# Patient Record
Sex: FEMALE | Race: WHITE
[De-identification: names, ages, dates, MRNs, and addresses within clinical notes are randomized per-mention and may not be internally consistent; named-entity substitution may affect disease eponyms.]

---

## 2017-06-12 ENCOUNTER — HOSPITAL ENCOUNTER (OUTPATIENT)
Dept: HOSPITAL 76 - DI | Age: 72
Discharge: HOME | End: 2017-06-12
Attending: FAMILY MEDICINE
Payer: MEDICARE

## 2017-06-12 DIAGNOSIS — Z12.31: Primary | ICD-10-CM

## 2017-06-12 PROCEDURE — 77067 SCR MAMMO BI INCL CAD: CPT

## 2017-06-13 NOTE — MAMMOGRAPHY REPORT
DIGITAL SCREENING MAMMOGRAM:  06/12/2017

 

CLINICAL INDICATION:  A 71-year-old with history of benign right breast biopsy for screening.  

 

COMPARISON:  08/2015, 01/2014, 07/2012, 05/2011, 04/2010.

 

TECHNIQUE:  Routine CC and MLO projections were obtained of the breasts.

 

FINDINGS:  The breasts again demonstrate scattered fibroglandular densities bilaterally.  A few punct
ate, typically benign calcifications are present.  No suspicious masses, clustered microcalcification
s, or regions of architectural distortion are identified.  

 

IMPRESSION:  BENIGN FINDINGS. 

 

RECOMMENDATION:  Routine annual screening unless otherwise clinically indicated. 

 

BI-RADS category 2, benign findings. 

 

STANDARD QUALIFYING STATEMENTS

 

1. This examination was reviewed with the aid of Computer-Aided Detection (CAD).

 

2. A negative or benign imaging report should not delay biopsy if clinically suspicious findings are 
present. Consider surgical consultation if warranted. More than 5% of cancers are not identified by i
maging.

 

3. Dense breasts may obscure an underlying neoplasm.

 

 

 

DD:06/13/2017 16:39:31  DT: 06/13/2017 18:04  JOB #: [id number]  EXT JOB #:[id number]

## 2017-06-15 ENCOUNTER — HOSPITAL ENCOUNTER (OUTPATIENT)
Dept: HOSPITAL 76 - LAB.WCP | Age: 72
Discharge: HOME | End: 2017-06-15
Attending: FAMILY MEDICINE
Payer: MEDICARE

## 2017-06-15 DIAGNOSIS — M81.0: ICD-10-CM

## 2017-06-15 DIAGNOSIS — I10: Primary | ICD-10-CM

## 2017-06-15 DIAGNOSIS — E04.2: ICD-10-CM

## 2017-06-15 LAB
ALBUMIN/GLOB SERPL: 1.1 {RATIO} (ref 1–2.2)
ANION GAP SERPL CALCULATED.4IONS-SCNC: 9 MMOL/L (ref 6–13)
BILIRUB BLD-MCNC: 0.7 MG/DL (ref 0.2–1)
BUN SERPL-MCNC: 20 MG/DL (ref 6–20)
CALCIUM UR-MCNC: 9.4 MG/DL (ref 8.5–10.3)
CHLORIDE SERPL-SCNC: 98 MMOL/L (ref 101–111)
CHOLEST SERPL-MCNC: 213 MG/DL
CO2 SERPL-SCNC: 28 MMOL/L (ref 21–32)
CREAT SERPLBLD-SCNC: 0.7 MG/DL (ref 0.4–1)
GFRSERPLBLD MDRD-ARVRAT: 82 ML/MIN/{1.73_M2} (ref 89–?)
GLOBULIN SER-MCNC: 3.7 G/DL (ref 2.1–4.2)
GLUCOSE SERPL-MCNC: 94 MG/DL (ref 70–100)
HDLC SERPL-MCNC: 67 MG/DL
HDLC SERPL: 3.2 {RATIO} (ref ?–4.4)
LDLC/HDLC SERPL: 2 {RATIO} (ref ?–4.4)
POTASSIUM SERPL-SCNC: 3.6 MMOL/L (ref 3.5–5)
PROT SPEC-MCNC: 7.7 G/DL (ref 6.7–8.2)
SODIUM SERPLBLD-SCNC: 135 MMOL/L (ref 135–145)
TRIGL P FAST SERPL-MCNC: 56 MG/DL
VLDLC SERPL-SCNC: 11 MG/DL

## 2017-06-15 PROCEDURE — 80053 COMPREHEN METABOLIC PANEL: CPT

## 2017-06-15 PROCEDURE — 36415 COLL VENOUS BLD VENIPUNCTURE: CPT

## 2017-06-15 PROCEDURE — 84443 ASSAY THYROID STIM HORMONE: CPT

## 2017-06-15 PROCEDURE — 80061 LIPID PANEL: CPT

## 2017-07-22 ENCOUNTER — HOSPITAL ENCOUNTER (EMERGENCY)
Dept: HOSPITAL 76 - ED | Age: 72
Discharge: HOME | End: 2017-07-22
Payer: MEDICARE

## 2017-07-22 VITALS — DIASTOLIC BLOOD PRESSURE: 74 MMHG | SYSTOLIC BLOOD PRESSURE: 152 MMHG

## 2017-07-22 DIAGNOSIS — I10: ICD-10-CM

## 2017-07-22 DIAGNOSIS — E03.9: ICD-10-CM

## 2017-07-22 DIAGNOSIS — M16.12: Primary | ICD-10-CM

## 2017-07-22 DIAGNOSIS — M81.0: ICD-10-CM

## 2017-07-22 PROCEDURE — 73502 X-RAY EXAM HIP UNI 2-3 VIEWS: CPT

## 2017-07-22 PROCEDURE — 99283 EMERGENCY DEPT VISIT LOW MDM: CPT

## 2017-07-22 NOTE — XRAY PRELIMINARY REPORT
Accession: [id number]

Exam: XR Hip w/Pelvis 2-3V LT

 

IMPRESSION: 

1. Negative for acute fracture in the pelvis and left hip; no dislocation or subluxation. 

2. Chronic focal deformity, likely old fracture in the left superior and inferior pubic rami. 

3. 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

 

[facility]

 

SITE ID: [id number]

## 2017-07-22 NOTE — ED PHYSICIAN DOCUMENTATION
History of Present Illness





- Stated complaint


Stated Complaint: LEFT HIP PX





- Chief complaint


Chief Complaint: Ext Problem





- Additonal information


Additional information: 





hx from pt


known severe osteoporosis and hx spont pelvic fx


severe posterior high left hip pain


no trauma


no rash


worse with trying to pear weight


unable to describe nature of pain but says it is 15/10





Review of Systems


Constitutional: denies: Fever, Chills


Cardiac: denies: Chest pain / pressure


Respiratory: denies: Dyspnea


GI: denies: Abdominal Pain


Skin: denies: Rash


Musculoskeletal: reports: Back pain, Joint pain


Endocrine: denies: Easy bruising / bleeding


Immunocompromised: denies: Immunocompromised





PD PAST MEDICAL HISTORY





- Past Medical History


Past Medical History: Yes


Cardiovascular: Hypertension


Respiratory: None


Neuro: None


Endocrine/Autoimmune: HyPOthyroidism


GI: None


: None


HEENT: None


Psych: None


Musculoskeletal: None


Derm: None





- Past Surgical History


Past Surgical History: Yes


/GYN: Hysterectomy, Oophrectomy





- Present Medications


Home Medications: 


 Ambulatory Orders











 Medication  Instructions  Recorded  Confirmed


 


Levothyroxine [Synthroid] 50 mcg PO QDAC 07/01/13 07/22/17


 


Lisinopril 10 mg PO DAILY 07/01/13 07/22/17


 


hydroCHLOROthiazide [Hydrodiuril] 25 mg PO DAILY 07/01/13 07/22/17


 


Cholecalciferol (Vitamin D3) 2,000 unit PO DAILY 08/06/15 07/22/17





[Vitamin D]   


 


Lidocaine Patch 5% [Lidoderm Patch] 1 each TOP DAILY PRN #10 patch 07/22/17 














- Allergies


Allergies/Adverse Reactions: 


 Allergies











Allergy/AdvReac Type Severity Reaction Status Date / Time


 


No Known Drug Allergies Allergy   Verified 07/01/13 10:01














- Social History


Does the pt smoke?: No


Smoking Status: Never smoker





PD ED PE NORMAL





- Vitals


Vital signs reviewed: Yes





- Cardiac


Cardiac: RRR





- Respiratory


Respiratory: No respiratory distress, Clear bilaterally





- Abdomen


Abdomen: Soft, Non tender, Other (no pulsatile mass)





- Back


Back: No spinal TTP, Other (TTP high lateral posterior hip between SI and 

breater troch, not short or rotated, MSV intact)





- Derm


Derm: No rash





Results





- Vitals


Vitals: 


 Vital Signs - 24 hr











  07/22/17 07/22/17





  10:05 11:32


 


Temperature 36.7 C 


 


Heart Rate 109 H 106 H


 


Respiratory 20 16





Rate  


 


Blood Pressure 171/88 H 152/74 H


 


O2 Saturation 100 99








 Oxygen











O2 Source                      Room air

















- Rads (name of study)


  ** hip pelvis


Radiology: See rad report (neg for acute, old rami fx and degen changes)





PD MEDICAL DECISION MAKING





- ED course


ED course: 





no injury, chronic / old fx sup inf rami and degen changes but no acute process 

on xrays


no rash to suggest shingles and that would be unlikely to be aggrevated by wt 

bearing


no abd TTP or pulsatile mass


no back pain, no radicular sx


will reassure and dc with pain control and PMD fup


pt able to ambulate in ER


cautioned re shingles rash


has a walker she can borrow











Departure





- Departure


Disposition: 01 Home, Self Care


Clinical Impression: 


 Arthritis


Condition: Good


Follow-Up: 


Abdias Porras MD [Provider Admit Priv/Credential] - 


Prescriptions: 


Lidocaine Patch 5% [Lidoderm Patch] 1 each TOP DAILY PRN #10 patch


 PRN Reason: Pain


Comments: 


The xray does not show any new fractures. You do have degenerative changes / 

arthritis


Your exam does not suggest an aneurysm or slipped disk


I think it is OK for you to go home with lidocaine patches and tylenol as 

needed for the apin and borrowing the walker for extra support until you feel 

better


As we discussed, sometimes shingles causes severe one sided pain for a few days 

before the rash becomes visible - please check your back every day and if you 

develop a one sided rash, see your PMD or come back to the ER for viral 

medications


Otherwise follow up with Dr Porras as needed - please have him check your blood 

pressure because it was high today

## 2017-07-22 NOTE — XRAY REPORT
EXAM:

LEFT HIP AND PELVIS RADIOGRAPHY

 

EXAM DATE: 7/22/2017 11:13 AM.

 

HISTORY: Severe high posterior lateral hip pain for 3 days. Osteoporosis, prior history of spontaneou
s pelvic fracture.

 

COMPARISONS: None.

 

TECHNIQUE: 1 view of the pelvis and 1 view of the hip.

 

FINDINGS: 

Bones: No left femur fracture or bone lesion. There is chronic deformity, likely old fracture in the 
left inferior ramus. There is also increased sclerotic density with expansile contour in the proximal
 part of the left superior pubic ramus, also suggesting posttraumatic changes.

 

Joints: The bilateral hip demonstrated mild diminishment of the joint space with mild subchondral scl
erotic reactions, greater in the right hip; the pubis symphysis, and sacroiliac joints and lower lumb
ar spine also demonstrate mild degenerative changes.

 

Soft Tissues: There is a calcific body in the inferior right lower pelvis, 9 x 13 mm, likely a nonspe
cific soft tissue calcification. No soft tissue swelling.

 

IMPRESSION: 

1. Negative for acute fracture in the pelvis and left hip; no dislocation or subluxation. 

2. Chronic focal deformity, likely old fracture in the left superior and inferior pubic rami. 

3. 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

 

[facility]

Referring Provider Line: [phone]

 

SITE ID: [id number]

## 2017-10-13 ENCOUNTER — HOSPITAL ENCOUNTER (OUTPATIENT)
Dept: HOSPITAL 76 - DI | Age: 72
Discharge: HOME | End: 2017-10-13
Attending: FAMILY MEDICINE
Payer: MEDICARE

## 2017-10-13 DIAGNOSIS — M81.0: Primary | ICD-10-CM

## 2017-10-13 PROCEDURE — 77080 DXA BONE DENSITY AXIAL: CPT

## 2017-10-13 NOTE — DEXA REPORT
DEXA SCAN:  10/13/2017



CLINICAL INDICATION: Postmenopausal. 



TECHNIQUE: Dual energy x-ray absorptiometry (DXA) was performed on a [employer] 
system. Regions measured are the AP spine, femoral neck, and, if needed, 
forearm.



COMPARISON: None. In accordance with the International Society for Clinical 
Densitometry (ISCD) guidelines, data from previous exams may be reanalyzed 
using current recommendations and techniques. This is done to allow a more 
accurate basis for comparison with the current study.



FINDINGS

The data for the lumbar spine is as follows:







REGION BMD (g/cm/cm) T-SCORE Z-SCORE

 

L1 0.911 -1.8 0.2

 

L2 0.855 -2.9 -0.8

 

L3 1.212 0.1 2.2

 

L4 1.364 1.4 3.4

 

TOTAL(L2-L4) 1.187 -0.1 1.9



NOTE: All evaluable vertebrae are used for classification.





The data for the hip is as follows:







REGION BMD (g/cm/cm) T-SCORE Z-SCORE

 

Neck 0.704 -2.4 -0.4

 

TOTAL 0.694 -2.5 -0.7



NOTE: The femoral neck or total proximal femur, whichever is lowest, is used 
for classification.





IMPRESSION: THE WHO CLASSIFICATION BASED ON THE INTERNATIONAL REFERENCE 
STANDARD IS OSTEOPOROSIS. THE FRACTURE RISK IS HIGH.



RECOMMENDATION: Patients with diagnosis of osteoporosis or osteopenia should 
have regular bone mineral density assessment. For those eligible for Medicare, 
routine testing is allowed once every 2 years. Testing frequency can be 
increased for patients who have rapidly progressing disease or for those who 
are receiving medical therapy to restore bone mass. 



COMMENT: World Health Organization (WHO) definitions for osteoporosis and 
osteopenia:

NORMAL BMD: T-score at -1.0 or higher, fracture risk is low.

OSTEOPENIA BMD: T-score between -1.0 and -2.5, fracture risk is increased.

OSTEOPOROSIS BMD: T-score at -2.5 or lower, fracture risk high.



National Osteoporosis Foundation recommends:

1. Obtain adequate dietary calcium (at least 1200 mg per day) and vitamin D (400
-800 international units per day).

2. Participate, as appropriate, in regular weightbearing and muscle-
strengthening exercise. 

3. Avoid tobacco use and reduce alcohol and caffeine intake. 

4. For more detailed information see the website at www.NOF.org.
MTDD

## 2018-06-05 ENCOUNTER — HOSPITAL ENCOUNTER (OUTPATIENT)
Dept: HOSPITAL 76 - LAB | Age: 73
Discharge: HOME | End: 2018-06-05
Attending: FAMILY MEDICINE
Payer: MEDICARE

## 2018-06-05 DIAGNOSIS — I10: Primary | ICD-10-CM

## 2018-06-05 DIAGNOSIS — M81.0: ICD-10-CM

## 2018-06-05 DIAGNOSIS — E04.2: ICD-10-CM

## 2018-06-05 LAB
ALBUMIN DIAFP-MCNC: 3.8 G/DL (ref 3.2–5.5)
ALBUMIN/GLOB SERPL: 1 {RATIO} (ref 1–2.2)
ALP SERPL-CCNC: 56 IU/L (ref 42–121)
ALT SERPL W P-5'-P-CCNC: 13 IU/L (ref 10–60)
ANION GAP SERPL CALCULATED.4IONS-SCNC: 2 MMOL/L (ref 6–13)
AST SERPL W P-5'-P-CCNC: 19 IU/L (ref 10–42)
BASOPHILS NFR BLD AUTO: 0 10^3/UL (ref 0–0.1)
BASOPHILS NFR BLD AUTO: 0.5 %
BILIRUB BLD-MCNC: 0.5 MG/DL (ref 0.2–1)
BUN SERPL-MCNC: 23 MG/DL (ref 6–20)
CALCIUM UR-MCNC: 9.1 MG/DL (ref 8.5–10.3)
CHLORIDE SERPL-SCNC: 97 MMOL/L (ref 101–111)
CO2 SERPL-SCNC: 34 MMOL/L (ref 21–32)
CREAT SERPLBLD-SCNC: 0.7 MG/DL (ref 0.4–1)
EOSINOPHIL # BLD AUTO: 0.1 10^3/UL (ref 0–0.7)
EOSINOPHIL NFR BLD AUTO: 1.7 %
ERYTHROCYTE [DISTWIDTH] IN BLOOD BY AUTOMATED COUNT: 13.4 % (ref 12–15)
GFRSERPLBLD MDRD-ARVRAT: 82 ML/MIN/{1.73_M2} (ref 89–?)
GLOBULIN SER-MCNC: 3.9 G/DL (ref 2.1–4.2)
GLUCOSE SERPL-MCNC: 98 MG/DL (ref 70–100)
HGB UR QL STRIP: 12.9 G/DL (ref 12–16)
LYMPHOCYTES # SPEC AUTO: 1.6 10^3/UL (ref 1.5–3.5)
LYMPHOCYTES NFR BLD AUTO: 22.6 %
MCH RBC QN AUTO: 30.8 PG (ref 27–31)
MCHC RBC AUTO-ENTMCNC: 34 G/DL (ref 32–36)
MCV RBC AUTO: 90.5 FL (ref 81–99)
MONOCYTES # BLD AUTO: 0.6 10^3/UL (ref 0–1)
MONOCYTES NFR BLD AUTO: 8.6 %
NEUTROPHILS # BLD AUTO: 4.6 10^3/UL (ref 1.5–6.6)
NEUTROPHILS # SNV AUTO: 7 X10^3/UL (ref 4.8–10.8)
NEUTROPHILS NFR BLD AUTO: 66.6 %
PDW BLD AUTO: 6.6 FL (ref 7.9–10.8)
PLATELET # BLD: 329 10^3/UL (ref 130–450)
PROT SPEC-MCNC: 7.7 G/DL (ref 6.7–8.2)
RBC MAR: 4.18 10^6/UL (ref 4.2–5.4)
SODIUM SERPLBLD-SCNC: 133 MMOL/L (ref 135–145)

## 2018-06-05 PROCEDURE — 85025 COMPLETE CBC W/AUTO DIFF WBC: CPT

## 2018-06-05 PROCEDURE — 80053 COMPREHEN METABOLIC PANEL: CPT

## 2018-06-05 PROCEDURE — 36415 COLL VENOUS BLD VENIPUNCTURE: CPT

## 2018-06-05 PROCEDURE — 84443 ASSAY THYROID STIM HORMONE: CPT

## 2018-06-13 ENCOUNTER — HOSPITAL ENCOUNTER (OUTPATIENT)
Dept: HOSPITAL 76 - LAB | Age: 73
Discharge: HOME | End: 2018-06-13
Attending: FAMILY MEDICINE
Payer: MEDICARE

## 2018-06-13 DIAGNOSIS — E04.2: ICD-10-CM

## 2018-06-13 DIAGNOSIS — M81.0: ICD-10-CM

## 2018-06-13 DIAGNOSIS — I10: Primary | ICD-10-CM

## 2018-06-13 LAB
ALBUMIN DIAFP-MCNC: 3.5 G/DL (ref 3.2–5.5)
ALBUMIN/GLOB SERPL: 0.9 {RATIO} (ref 1–2.2)
ALP SERPL-CCNC: 58 IU/L (ref 42–121)
ALT SERPL W P-5'-P-CCNC: 13 IU/L (ref 10–60)
ANION GAP SERPL CALCULATED.4IONS-SCNC: 6 MMOL/L (ref 6–13)
AST SERPL W P-5'-P-CCNC: 21 IU/L (ref 10–42)
BASOPHILS NFR BLD AUTO: 0.1 10^3/UL (ref 0–0.1)
BASOPHILS NFR BLD AUTO: 1.1 %
BILIRUB BLD-MCNC: 0.4 MG/DL (ref 0.2–1)
BUN SERPL-MCNC: 16 MG/DL (ref 6–20)
CALCIUM UR-MCNC: 8.9 MG/DL (ref 8.5–10.3)
CHLORIDE SERPL-SCNC: 100 MMOL/L (ref 101–111)
CO2 SERPL-SCNC: 29 MMOL/L (ref 21–32)
CREAT SERPLBLD-SCNC: 0.7 MG/DL (ref 0.4–1)
EOSINOPHIL # BLD AUTO: 0.1 10^3/UL (ref 0–0.7)
EOSINOPHIL NFR BLD AUTO: 1.6 %
ERYTHROCYTE [DISTWIDTH] IN BLOOD BY AUTOMATED COUNT: 13.6 % (ref 12–15)
GFRSERPLBLD MDRD-ARVRAT: 82 ML/MIN/{1.73_M2} (ref 89–?)
GLOBULIN SER-MCNC: 4.1 G/DL (ref 2.1–4.2)
GLUCOSE SERPL-MCNC: 93 MG/DL (ref 70–100)
HGB UR QL STRIP: 12.6 G/DL (ref 12–16)
LYMPHOCYTES # SPEC AUTO: 1.4 10^3/UL (ref 1.5–3.5)
LYMPHOCYTES NFR BLD AUTO: 21.2 %
MCH RBC QN AUTO: 31.2 PG (ref 27–31)
MCHC RBC AUTO-ENTMCNC: 33.4 G/DL (ref 32–36)
MCV RBC AUTO: 93.4 FL (ref 81–99)
MONOCYTES # BLD AUTO: 0.4 10^3/UL (ref 0–1)
MONOCYTES NFR BLD AUTO: 6.1 %
NEUTROPHILS # BLD AUTO: 4.6 10^3/UL (ref 1.5–6.6)
NEUTROPHILS # SNV AUTO: 6.5 X10^3/UL (ref 4.8–10.8)
NEUTROPHILS NFR BLD AUTO: 70 %
PDW BLD AUTO: 7.1 FL (ref 7.9–10.8)
PLATELET # BLD: 343 10^3/UL (ref 130–450)
PROT SPEC-MCNC: 7.6 G/DL (ref 6.7–8.2)
RBC MAR: 4.02 10^6/UL (ref 4.2–5.4)
SODIUM SERPLBLD-SCNC: 135 MMOL/L (ref 135–145)

## 2018-06-13 PROCEDURE — 84443 ASSAY THYROID STIM HORMONE: CPT

## 2018-06-13 PROCEDURE — 80048 BASIC METABOLIC PNL TOTAL CA: CPT

## 2018-06-13 PROCEDURE — 80053 COMPREHEN METABOLIC PANEL: CPT

## 2018-06-13 PROCEDURE — 36415 COLL VENOUS BLD VENIPUNCTURE: CPT

## 2018-06-13 PROCEDURE — 85025 COMPLETE CBC W/AUTO DIFF WBC: CPT

## 2018-08-06 ENCOUNTER — HOSPITAL ENCOUNTER (OUTPATIENT)
Dept: HOSPITAL 76 - LAB.WCP | Age: 73
Discharge: HOME | End: 2018-08-06
Attending: FAMILY MEDICINE
Payer: MEDICARE

## 2018-08-06 DIAGNOSIS — I10: ICD-10-CM

## 2018-08-06 DIAGNOSIS — R25.2: Primary | ICD-10-CM

## 2018-08-06 LAB
ANION GAP SERPL CALCULATED.4IONS-SCNC: 6 MMOL/L (ref 6–13)
BUN SERPL-MCNC: 19 MG/DL (ref 6–20)
CALCIUM UR-MCNC: 9 MG/DL (ref 8.5–10.3)
CHLORIDE SERPL-SCNC: 103 MMOL/L (ref 101–111)
CO2 SERPL-SCNC: 26 MMOL/L (ref 21–32)
CREAT SERPLBLD-SCNC: 0.9 MG/DL (ref 0.4–1)
GFRSERPLBLD MDRD-ARVRAT: 61 ML/MIN/{1.73_M2} (ref 89–?)
GLUCOSE SERPL-MCNC: 98 MG/DL (ref 70–100)
SODIUM SERPLBLD-SCNC: 135 MMOL/L (ref 135–145)

## 2018-08-06 PROCEDURE — 36415 COLL VENOUS BLD VENIPUNCTURE: CPT

## 2018-08-06 PROCEDURE — 80048 BASIC METABOLIC PNL TOTAL CA: CPT

## 2018-09-06 ENCOUNTER — HOSPITAL ENCOUNTER (OUTPATIENT)
Dept: HOSPITAL 76 - RT | Age: 73
Discharge: HOME | End: 2018-09-06
Attending: SURGERY
Payer: MEDICARE

## 2018-09-06 ENCOUNTER — HOSPITAL ENCOUNTER (OUTPATIENT)
Dept: HOSPITAL 76 - DI | Age: 73
Discharge: HOME | End: 2018-09-06
Attending: FAMILY MEDICINE
Payer: MEDICARE

## 2018-09-06 DIAGNOSIS — Z12.31: Primary | ICD-10-CM

## 2018-09-06 DIAGNOSIS — I10: Primary | ICD-10-CM

## 2018-09-06 PROCEDURE — 93005 ELECTROCARDIOGRAM TRACING: CPT

## 2018-09-06 PROCEDURE — 77067 SCR MAMMO BI INCL CAD: CPT

## 2018-09-07 NOTE — MAMMOGRAPHY REPORT
Reason:  SCREENING MAMMO

Procedure Date:  09/06/2018   

Accession Number:  [id number] / [id number]                    

Procedure:  SLIM - Screening Mammo Dig Bilat CPT Code:  

 

FULL RESULT:

 

 

EXAM: Screening Mammo Dig Bilat

 

DATE: 9/6/2018 4:00 PM

 

CLINICAL HISTORY: Routine screening. Prior history of benign right breast 

biopsy

 

TECHNIQUE: Bilateral CC and MLO views were obtained.

 

COMPARISON: 6/12/2017, 8/21/2015, and 1/29/2014

 

FINDINGS:

The breast tissue is heterogeneously dense. There is been no significant 

interval change. No suspicious masses, clustered microcalcifications, or 

regions of architectural distortion are identified.

 

IMPRESSION: Negative examination

 

RECOMMENDATION: Routine annual screening unless otherwise clinically 

indicated.

 

BIRADS CATEGORY 1: Negative

 

STANDARD QUALIFYING STATEMENTS:

1.  This examination was reviewed with the aid of Computer-Aided 

Detection (CAD).

2.  A negative or benign  imaging report should not delay biopsy if 

clinically suspicious findings are present.  Consider surgical 

consultation if warrented.  More than 5% of cancers are not identified by 

imaging.

3.  Dense breasts may obscure an underlying neoplasm.

## 2018-09-13 ENCOUNTER — HOSPITAL ENCOUNTER (OUTPATIENT)
Dept: HOSPITAL 76 - SDS | Age: 73
Discharge: HOME | End: 2018-09-13
Attending: SURGERY
Payer: MEDICARE

## 2018-09-13 VITALS — DIASTOLIC BLOOD PRESSURE: 70 MMHG | SYSTOLIC BLOOD PRESSURE: 137 MMHG

## 2018-09-13 DIAGNOSIS — Z12.11: Primary | ICD-10-CM

## 2018-09-13 DIAGNOSIS — I10: ICD-10-CM

## 2018-09-13 DIAGNOSIS — E03.9: ICD-10-CM

## 2018-09-13 DIAGNOSIS — D12.8: ICD-10-CM

## 2018-09-13 DIAGNOSIS — K57.30: ICD-10-CM

## 2018-09-13 PROCEDURE — 0DBP8ZZ EXCISION OF RECTUM, VIA NATURAL OR ARTIFICIAL OPENING ENDOSCOPIC: ICD-10-PCS | Performed by: SURGERY

## 2018-09-13 PROCEDURE — 45380 COLONOSCOPY AND BIOPSY: CPT

## 2019-06-04 ENCOUNTER — HOSPITAL ENCOUNTER (OUTPATIENT)
Dept: HOSPITAL 76 - LAB.WCP | Age: 74
Discharge: HOME | End: 2019-06-04
Attending: FAMILY MEDICINE
Payer: MEDICARE

## 2019-06-04 DIAGNOSIS — E03.9: Primary | ICD-10-CM

## 2019-06-04 DIAGNOSIS — I10: ICD-10-CM

## 2019-06-04 LAB
ALBUMIN DIAFP-MCNC: 3.9 G/DL (ref 3.2–5.5)
ALBUMIN/GLOB SERPL: 1 {RATIO} (ref 1–2.2)
ALP SERPL-CCNC: 59 IU/L (ref 42–121)
ALT SERPL W P-5'-P-CCNC: 12 IU/L (ref 10–60)
ANION GAP SERPL CALCULATED.4IONS-SCNC: 10 MMOL/L (ref 6–13)
AST SERPL W P-5'-P-CCNC: 20 IU/L (ref 10–42)
BASOPHILS NFR BLD AUTO: 0 10^3/UL (ref 0–0.1)
BASOPHILS NFR BLD AUTO: 0.5 %
BILIRUB BLD-MCNC: 0.6 MG/DL (ref 0.2–1)
BUN SERPL-MCNC: 17 MG/DL (ref 6–20)
CALCIUM UR-MCNC: 9.2 MG/DL (ref 8.5–10.3)
CHLORIDE SERPL-SCNC: 101 MMOL/L (ref 101–111)
CHOLEST SERPL-MCNC: 195 MG/DL
CO2 SERPL-SCNC: 24 MMOL/L (ref 21–32)
CREAT SERPLBLD-SCNC: 0.7 MG/DL (ref 0.4–1)
EOSINOPHIL # BLD AUTO: 0.2 10^3/UL (ref 0–0.7)
EOSINOPHIL NFR BLD AUTO: 3.4 %
ERYTHROCYTE [DISTWIDTH] IN BLOOD BY AUTOMATED COUNT: 13.9 % (ref 12–15)
GFRSERPLBLD MDRD-ARVRAT: 82 ML/MIN/{1.73_M2} (ref 89–?)
GLOBULIN SER-MCNC: 4 G/DL (ref 2.1–4.2)
GLUCOSE SERPL-MCNC: 104 MG/DL (ref 70–100)
HDLC SERPL-MCNC: 63 MG/DL
HDLC SERPL: 3.1 {RATIO} (ref ?–4.4)
HGB UR QL STRIP: 12.5 G/DL (ref 12–16)
LDLC SERPL CALC-MCNC: 119 MG/DL
LDLC/HDLC SERPL: 1.9 {RATIO} (ref ?–4.4)
LYMPHOCYTES # SPEC AUTO: 1.5 10^3/UL (ref 1.5–3.5)
LYMPHOCYTES NFR BLD AUTO: 24.4 %
MCH RBC QN AUTO: 30.6 PG (ref 27–31)
MCHC RBC AUTO-ENTMCNC: 32.9 G/DL (ref 32–36)
MCV RBC AUTO: 92.9 FL (ref 81–99)
MONOCYTES # BLD AUTO: 0.4 10^3/UL (ref 0–1)
MONOCYTES NFR BLD AUTO: 6.9 %
NEUTROPHILS # BLD AUTO: 4.1 10^3/UL (ref 1.5–6.6)
NEUTROPHILS # SNV AUTO: 6.3 X10^3/UL (ref 4.8–10.8)
NEUTROPHILS NFR BLD AUTO: 64.8 %
PDW BLD AUTO: 8.1 FL (ref 7.9–10.8)
PLATELET # BLD: 372 10^3/UL (ref 130–450)
PROT SPEC-MCNC: 7.9 G/DL (ref 6.7–8.2)
RBC MAR: 4.09 10^6/UL (ref 4.2–5.4)
SODIUM SERPLBLD-SCNC: 135 MMOL/L (ref 135–145)
VLDLC SERPL-SCNC: 13 MG/DL

## 2019-06-04 PROCEDURE — 84443 ASSAY THYROID STIM HORMONE: CPT

## 2019-06-04 PROCEDURE — 85025 COMPLETE CBC W/AUTO DIFF WBC: CPT

## 2019-06-04 PROCEDURE — 80061 LIPID PANEL: CPT

## 2019-06-04 PROCEDURE — 36415 COLL VENOUS BLD VENIPUNCTURE: CPT

## 2019-06-04 PROCEDURE — 80053 COMPREHEN METABOLIC PANEL: CPT

## 2019-06-04 PROCEDURE — 83721 ASSAY OF BLOOD LIPOPROTEIN: CPT

## 2021-04-16 ENCOUNTER — HOSPITAL ENCOUNTER (OUTPATIENT)
Dept: HOSPITAL 76 - LAB.WCP | Age: 76
Discharge: HOME | End: 2021-04-16
Attending: NURSE PRACTITIONER
Payer: MEDICARE

## 2021-04-16 DIAGNOSIS — I10: ICD-10-CM

## 2021-04-16 DIAGNOSIS — E03.9: Primary | ICD-10-CM

## 2021-04-16 LAB
ALBUMIN DIAFP-MCNC: 3.8 G/DL (ref 3.2–5.5)
ALBUMIN/GLOB SERPL: 0.9 {RATIO} (ref 1–2.2)
ALP SERPL-CCNC: 72 IU/L (ref 42–121)
ALT SERPL W P-5'-P-CCNC: 15 IU/L (ref 10–60)
ANION GAP SERPL CALCULATED.4IONS-SCNC: 11 MMOL/L (ref 6–13)
AST SERPL W P-5'-P-CCNC: 18 IU/L (ref 10–42)
BASOPHILS NFR BLD AUTO: 0.1 10^3/UL (ref 0–0.1)
BASOPHILS NFR BLD AUTO: 0.7 %
BILIRUB BLD-MCNC: 0.3 MG/DL (ref 0.2–1)
BUN SERPL-MCNC: 22 MG/DL (ref 6–20)
CALCIUM UR-MCNC: 9.8 MG/DL (ref 8.5–10.3)
CHLORIDE SERPL-SCNC: 103 MMOL/L (ref 101–111)
CHOLEST SERPL-MCNC: 202 MG/DL
CO2 SERPL-SCNC: 26 MMOL/L (ref 21–32)
CREAT SERPLBLD-SCNC: 0.9 MG/DL (ref 0.4–1)
EOSINOPHIL # BLD AUTO: 0.2 10^3/UL (ref 0–0.7)
EOSINOPHIL NFR BLD AUTO: 1.8 %
ERYTHROCYTE [DISTWIDTH] IN BLOOD BY AUTOMATED COUNT: 13.2 % (ref 12–15)
GFRSERPLBLD MDRD-ARVRAT: 61 ML/MIN/{1.73_M2} (ref 89–?)
GLOBULIN SER-MCNC: 4.1 G/DL (ref 2.1–4.2)
GLUCOSE SERPL-MCNC: 107 MG/DL (ref 70–100)
HCT VFR BLD AUTO: 38.2 % (ref 37–47)
HDLC SERPL-MCNC: 68 MG/DL
HDLC SERPL: 3 {RATIO} (ref ?–4.4)
HGB UR QL STRIP: 12.1 G/DL (ref 12–16)
LDLC SERPL CALC-MCNC: 116 MG/DL
LDLC/HDLC SERPL: 1.7 {RATIO} (ref ?–4.4)
LYMPHOCYTES # SPEC AUTO: 1.4 10^3/UL (ref 1.5–3.5)
LYMPHOCYTES NFR BLD AUTO: 16.2 %
MCH RBC QN AUTO: 30.8 PG (ref 27–31)
MCHC RBC AUTO-ENTMCNC: 31.7 G/DL (ref 32–36)
MCV RBC AUTO: 97.2 FL (ref 81–99)
MONOCYTES # BLD AUTO: 0.6 10^3/UL (ref 0–1)
MONOCYTES NFR BLD AUTO: 7.2 %
NEUTROPHILS # BLD AUTO: 6.5 10^3/UL (ref 1.5–6.6)
NEUTROPHILS # SNV AUTO: 8.8 X10^3/UL (ref 4.8–10.8)
NEUTROPHILS NFR BLD AUTO: 73.9 %
NRBC # BLD AUTO: 0 /100WBC
NRBC # BLD AUTO: 0 X10^3/UL
PDW BLD AUTO: 9.5 FL (ref 7.9–10.8)
PLATELET # BLD: 451 10^3/UL (ref 130–450)
POTASSIUM SERPL-SCNC: 4.3 MMOL/L (ref 3.5–5)
PROT SPEC-MCNC: 7.9 G/DL (ref 6.7–8.2)
RBC MAR: 3.93 10^6/UL (ref 4.2–5.4)
SODIUM SERPLBLD-SCNC: 140 MMOL/L (ref 135–145)
TRIGL P FAST SERPL-MCNC: 92 MG/DL
TSH SERPL-ACNC: 1.58 UIU/ML (ref 0.34–5.6)
VLDLC SERPL-SCNC: 18 MG/DL

## 2021-04-16 PROCEDURE — 80053 COMPREHEN METABOLIC PANEL: CPT

## 2021-04-16 PROCEDURE — 85025 COMPLETE CBC W/AUTO DIFF WBC: CPT

## 2021-04-16 PROCEDURE — 84443 ASSAY THYROID STIM HORMONE: CPT

## 2021-04-16 PROCEDURE — 36415 COLL VENOUS BLD VENIPUNCTURE: CPT

## 2021-04-16 PROCEDURE — 83721 ASSAY OF BLOOD LIPOPROTEIN: CPT

## 2021-04-16 PROCEDURE — 80061 LIPID PANEL: CPT

## 2021-04-26 ENCOUNTER — HOSPITAL ENCOUNTER (OUTPATIENT)
Dept: HOSPITAL 76 - DI | Age: 76
Discharge: HOME | End: 2021-04-26
Attending: GENERAL ACUTE CARE HOSPITAL
Payer: MEDICARE

## 2021-04-26 DIAGNOSIS — Z12.31: Primary | ICD-10-CM

## 2021-04-27 NOTE — MAMMOGRAPHY REPORT
BILATERAL DIGITAL SCREENING MAMMOGRAM 3D/2D: 4/26/2021

 

CLINICAL: Routine screening.  

 

Comparison is made to exams dated:  9/6/2018 mammogram, 6/12/2017 mammogram, 8/21/2015 mammogram, and
 1/29/2014 mammogram - [facility].  The tissue of both breasts is heterogeneously d
ense. This may lower the sensitivity of mammography.  

 

No significant masses, calcifications, or other findings are seen in either breast.  

There has been no significant interval change.

 

IMPRESSION: NEGATIVE

There is no mammographic evidence of malignancy. A 1 year screening mammogram is recommended.  

 

 

This exam was interpreted at Station ID: [id number].  

 

NOTE: For mammograms, a report in lay terms will be sent to the patient. Approximately 15% of breast 
malignancies will not be visualized mammographically. In the management of a palpable breast mass, a 
negative mammogram must not discourage biopsy of a clinically suspicious lesion.

 

Electronically Signed By: Krystal juarez/melissa:4/26/2021 20:10:31  

 

 

 

ACR BI-RADS Category 1: Negative 3341F

PARENCHYMAL PATTERN: (D) - The breast(s) demonstrate(s) heterogeneously dense fibroglandular lane mejia.

BI-RADS CATEGORY: (1) - 1

RECOMMENDATION: (ANNUAL)  - Recommend routine annual screening mammography.

[id number]

1 year screening

LATERALITY: (B)

## 2021-06-02 ENCOUNTER — HOSPITAL ENCOUNTER (OUTPATIENT)
Dept: HOSPITAL 76 - DI | Age: 76
Discharge: HOME | End: 2021-06-02
Attending: NURSE PRACTITIONER
Payer: MEDICARE

## 2021-06-02 DIAGNOSIS — M85.89: ICD-10-CM

## 2021-06-02 DIAGNOSIS — E04.2: Primary | ICD-10-CM

## 2021-06-02 NOTE — ULTRASOUND REPORT
PROCEDURE:  Head or Neck Soft Tissue

 

INDICATIONS:  HYPOTHYROIDISM, GOITER, MULTINODULAR, OSTEOPOROSIS

 

TECHNIQUE:  Real time scanning was performed of the neck region of interest, with image documentation
.  

 

COMPARISON:  Prior thyroid ultrasound dated 5/19/2011.

 

FINDINGS:  No soft tissue neck abnormality seen bilaterally  

 

IMPRESSION:  

PROCEDURE:  Head or Neck Soft Tissue

 

INDICATIONS:  HYPOTHYROIDISM, GOITER, MULTINODULAR, OSTEOPOROSIS

 

TECHNIQUE:  

Real-time scanning was performed of the thyroid gland, with image documentation.  

 

COMPARISON:  None

 

FINDINGS:  

Right:  Thyroid lobe measures 3.6 x 1.3 x 0.6 cm, and is homogeneous in echotexture.  

Left:  Thyroid lobe measures 5.1 x 2.9 x 3.9 cm, and is homogenous in echotexture.  

Isthmus:  5.0  mm thick.  

 

Nodule number:  One

Location:  Right inferior

Size:  Unchanged at 0.8 x 0.8 x 0.7   cm.  

Composition:  Cystic  

Echogenicity:  Anechoic   

Shape:  wider than tall.

Margins:  Smooth

Echogenic foci:  None

Total points:  0

ACR TI-RADS category:  Colloid cyst

 

Nodule number:  Two

Location:  Right inferior

Size:  0.5 x 0.5 x 0.3 cm.  

Composition:  Predominately solid

Echogenicity:  Hypoechoic 

Shape:  wider than tall.

Margins:  Smooth

Echogenic foci:  Punctate echogenic foci 

Total points:  7 

ACR TI-RADS category:  Highly suspicious

 

Nodule number:  Three

Location:  Left

Size:  Slightly increased at at 5.1 x 2.9 x 3.9 cm.  

Composition:   Solid

Echogenicity:  Hyperechoic 

Shape:  wider than tall.

Margins:  Smooth 

Echogenic foci:  Macrocalcifications 

Total points:  5 

ACR TI-RADS category:  Moderately suspicious

 

 

IMPRESSION:  Slight increase in size of moderately suspicious left thyroid nodule. Recommend sonograp
hically directed fine needle aspiration for pathologic diagnosis.

 

ACR TI-RADS definitions and recommendations:  

TI-RADS 1 (benign): 0 points.  FNA not needed. 

TI-RADS 2 (not suspicious): 2 points.  FNA not needed. 

TI-RADS 3 (mildly suspicious): 3 points. 

?  FNA if 2.5 cm or larger, follow up if 1.5 cm or larger (at 1, 3, and 5 years). 

TI-RADS 4 (moderately suspicious): 4-6 points.  

?  FNA if 1.5 cm or larger, follow up if 1 cm or larger (at 1, 2, 3, and 5 years).  

TI-RADS 5 (highly suspicious): 7 points or more.  

?  FNA if 1 cm or larger, follow up if 0.5 cm or larger (every year for 5 years).  

 

Reviewed by: MANNY Galaviz on 6/2/2021 5:17 PM PDT

Approved by: Vaughn Hester MD on 6/2/2021 5:17 PM PDT

 

 

Station ID:  [id number]

## 2021-06-02 NOTE — DEXA REPORT
PROCEDURE: Dexa Spine and/or Hip

 

INDICATIONS: HYPOTHYROIDISM, GOITER, MULTINODULAR, OSTEOPOROSIS

 

TECHNIQUE: Dual energy x-ray absorptiometry (DXA) was performed on a [email or web address] System.  Regions measur
ed are the AP Spine, femoral neck, and if needed forearm.

 

COMPARISON: None.

  

FINDINGS:

 

Lumbar Spine: 

Bone Mineral Density   0.85 g/cm/cm,T score  -2.3, osteopenia

 

Left Hip:

Bone Mineral Density  0.715 g/cm/cm,T score -2.3, osteopenia

 

Left Femoral Neck:

Bone Mineral Density  0.753 g/cm/cm, T score -2.1, osteopenia

 

 

(T score greater or equal to -1.0: NORMAL)

(T score from -1.1 to -2.4: OSTEOPENIA)

(T score less than or equal to -2.5 to: OSTEOPOROSIS)

 

 

Impression: Osteopenia.

 

Patients with diagnosis of osteoporosis or osteopenia should have regular bone mineral density assess
ment.  For those eligible for Medicare, routine testing is allowed once every 2 years.  Testing frequ
ency can be increased for patients who have rapidly progressing disease or for those who are receivin
g medical therapy to restore bone mass.

 

Reviewed by: Esperanza Wright MD, PhD on 6/2/2021 4:31 PM PDT

Approved by: Esperanza Wright MD, PhD on 6/2/2021 4:31 PM PDT

 

 

Station ID:  [id number]

## 2021-06-28 ENCOUNTER — HOSPITAL ENCOUNTER (OUTPATIENT)
Dept: HOSPITAL 76 - DI | Age: 76
Discharge: HOME | End: 2021-06-28
Attending: PHYSICIAN ASSISTANT
Payer: MEDICARE

## 2021-06-28 DIAGNOSIS — E04.2: Primary | ICD-10-CM

## 2021-06-28 PROCEDURE — 10005 FNA BX W/US GDN 1ST LES: CPT

## 2021-06-28 NOTE — ULTRASOUND REPORT
PROCEDURE:  FNA Bx w/US Gnd 1st les

 

INDICATIONS:  MULTINODULAR GOITER

 

TECHNIQUE:  

The indications, alternatives, benefits, risks, and complications of the procedure were explained to 
the patient.  Written informed consent was obtained and placed in the chart.  The area of interest wa
s examined sonographically and a site was chosen for ultrasound guided percutaneous sampling.  The sk
in was prepared and draped in the usual fashion, and anesthetized with 1% lidocaine infiltrated from 
the skin down to the lesion.  Multiple passes were then performed, with contents emptied into an [facility] pathology specimen container.  A bandage was applied to the area of access at completion of t
he study.  

 

COMPARISON:  Thyroid ultrasound 6/2/2021

 

FINDINGS:  

Location(s) of lesion(s) sampled:  Left inferior lobe

Needles:  25 gauge hypodermic needles.  

Number of passes:  5

Medications:  1% lidocaine for local anaesthesia.  

Complications:  None.  

 

IMPRESSION:  

Successful ultrasound-guided left inferior lobe fine needle aspiration, with cytology results pending
.  

 

Reviewed by: Cher Briseno MD on 6/28/2021 3:49 PM PDT

Approved by: Cher Briseno MD on 6/28/2021 3:49 PM PDT

 

 

Station ID:  [id number]

## 2021-10-13 ENCOUNTER — HOSPITAL ENCOUNTER (OUTPATIENT)
Dept: HOSPITAL 76 - LAB.WCP | Age: 76
Discharge: HOME | End: 2021-10-13
Attending: FAMILY MEDICINE
Payer: MEDICARE

## 2021-10-13 DIAGNOSIS — E04.2: ICD-10-CM

## 2021-10-13 DIAGNOSIS — I10: Primary | ICD-10-CM

## 2021-10-13 LAB
ALBUMIN DIAFP-MCNC: 4 G/DL (ref 3.2–5.5)
ALBUMIN/GLOB SERPL: 1 {RATIO} (ref 1–2.2)
ALP SERPL-CCNC: 64 IU/L (ref 42–121)
ALT SERPL W P-5'-P-CCNC: 13 IU/L (ref 10–60)
ANION GAP SERPL CALCULATED.4IONS-SCNC: 9 MMOL/L (ref 6–13)
AST SERPL W P-5'-P-CCNC: 19 IU/L (ref 10–42)
BASOPHILS NFR BLD AUTO: 0.1 10^3/UL (ref 0–0.1)
BASOPHILS NFR BLD AUTO: 0.6 %
BILIRUB BLD-MCNC: 0.5 MG/DL (ref 0.2–1)
BUN SERPL-MCNC: 23 MG/DL (ref 6–20)
CALCIUM UR-MCNC: 9.5 MG/DL (ref 8.5–10.3)
CHLORIDE SERPL-SCNC: 99 MMOL/L (ref 101–111)
CO2 SERPL-SCNC: 26 MMOL/L (ref 21–32)
CREAT SERPLBLD-SCNC: 0.8 MG/DL (ref 0.4–1)
EOSINOPHIL # BLD AUTO: 0.2 10^3/UL (ref 0–0.7)
EOSINOPHIL NFR BLD AUTO: 2.8 %
ERYTHROCYTE [DISTWIDTH] IN BLOOD BY AUTOMATED COUNT: 13.3 % (ref 12–15)
GFRSERPLBLD MDRD-ARVRAT: 70 ML/MIN/{1.73_M2} (ref 89–?)
GLOBULIN SER-MCNC: 4 G/DL (ref 2.1–4.2)
GLUCOSE SERPL-MCNC: 104 MG/DL (ref 70–100)
HCT VFR BLD AUTO: 39.3 % (ref 37–47)
HGB UR QL STRIP: 12.3 G/DL (ref 12–16)
LYMPHOCYTES # SPEC AUTO: 1.7 10^3/UL (ref 1.5–3.5)
LYMPHOCYTES NFR BLD AUTO: 21.4 %
MCH RBC QN AUTO: 30.7 PG (ref 27–31)
MCHC RBC AUTO-ENTMCNC: 31.3 G/DL (ref 32–36)
MCV RBC AUTO: 98 FL (ref 81–99)
MONOCYTES # BLD AUTO: 0.6 10^3/UL (ref 0–1)
MONOCYTES NFR BLD AUTO: 7.1 %
NEUTROPHILS # BLD AUTO: 5.4 10^3/UL (ref 1.5–6.6)
NEUTROPHILS # SNV AUTO: 8 X10^3/UL (ref 4.8–10.8)
NEUTROPHILS NFR BLD AUTO: 67.2 %
NRBC # BLD AUTO: 0 /100WBC
NRBC # BLD AUTO: 0 X10^3/UL
PDW BLD AUTO: 9.8 FL (ref 7.9–10.8)
PLATELET # BLD: 443 10^3/UL (ref 130–450)
POTASSIUM SERPL-SCNC: 4.2 MMOL/L (ref 3.5–5)
PROT SPEC-MCNC: 8 G/DL (ref 6.7–8.2)
RBC MAR: 4.01 10^6/UL (ref 4.2–5.4)
SODIUM SERPLBLD-SCNC: 134 MMOL/L (ref 135–145)
TSH SERPL-ACNC: 1.69 UIU/ML (ref 0.34–5.6)

## 2021-10-13 PROCEDURE — 85025 COMPLETE CBC W/AUTO DIFF WBC: CPT

## 2021-10-13 PROCEDURE — 80053 COMPREHEN METABOLIC PANEL: CPT

## 2021-10-13 PROCEDURE — 36415 COLL VENOUS BLD VENIPUNCTURE: CPT

## 2021-10-13 PROCEDURE — 86376 MICROSOMAL ANTIBODY EACH: CPT

## 2021-10-13 PROCEDURE — 84443 ASSAY THYROID STIM HORMONE: CPT

## 2021-10-13 PROCEDURE — 86800 THYROGLOBULIN ANTIBODY: CPT

## 2021-10-15 LAB
THYROGLOB AB SERPL-ACNC: <1 IU/ML
THYROID PEROXIDASE ANTIBODIES: 1 IU/ML (ref ?–9)

## 2022-04-14 ENCOUNTER — HOSPITAL ENCOUNTER (OUTPATIENT)
Dept: HOSPITAL 76 - DI | Age: 77
Discharge: HOME | End: 2022-04-14
Attending: FAMILY MEDICINE
Payer: MEDICARE

## 2022-04-14 DIAGNOSIS — E04.2: Primary | ICD-10-CM

## 2022-04-15 NOTE — ULTRASOUND REPORT
PROCEDURE:  Head or Neck Soft Tissue

 

INDICATIONS:  MULTINODULAR GOITER

 

TECHNIQUE:  Real time scanning was performed of the neck region of interest, with image documentation
.  

 

COMPARISON:  Prior studies dating back to June 2, 2021.

 

FINDINGS: 

 

Right: Thyroid lobe measures 3.7 x 1.4 x 1.2 cm, and is homogenous in echotexture.

 

Left:  Thyroid lobe measures 5.3 x 2.4 x 2.9 cm, and is homogenous in echotexture.

 

Isthmus:  0.3 cm

 

Nodule number:  1

Location:  Right inferior

Size:  0.8 x 0.7 x 1 cm; previously 0.8 x 0.8 x 0.7 cm

Composition:  Cystic

Echogenicity:  Anechoic

Shape:  Wider than tall.

Margins:  Smooth

Echogenic foci:  None

Total points:  0

ACR TI-RADS category:  TR 1; benign

 

Nodule number:  2

Location:  Right inferior

Size:  0.6 x 0.4 x 0.5 cm; previously 0.5 x 0.5 x 0.3 cm

Composition:  Predominantly solid

Echogenicity:  Hypoechoic

Shape:  Wider than tall.

Margins:  Lobulated

Echogenic foci:  Punctate

Total points:  9

ACR TI-RADS category:  TR 5; highly suspicious. Follow-up every year for 5 years.

 

Nodule number:  3

Location:  Isthmus left

Size:  0.5 x 0.4 x 0.6 cm; previously seen

Composition:  Solid

Echogenicity:  Hypoechoic

Shape:  Wider than tall.

Margins:  Smooth

Echogenic foci:  None

Total points:  4

ACR TI-RADS category:  TR 4; moderately suspicious.

 

Nodule number:  4

Location:  Left inferior/mid

Size:  4.5 x 2.7 x 3.7 cm; previously 5.1 x 2.9 x 3.9 cm

Composition:  Solid

Echogenicity:  Hyperechoic

Shape:  Wider than tall.

Margins:  Lobular

Echogenic foci:  Macrocalcification

Total points:  4

ACR TI-RADS category:  TR 4; moderately suspicious; previously biopsied.

 

Nodule number:  5

Location:  Left superior, posterior

Size:  1 x 0.6 x 0.7 cm; previously seen

Composition:  Cystic

Echogenicity:  Anechoic

Shape:  Wider than tall.

Margins:  Irregular

Echogenic foci:  None

Total points:  0

ACR TI-RADS category:  TR 1: Benign

 

IMPRESSION: 

Bilateral thyroid nodules as detailed above. The left inferior lesion was recently biopsied on June 2
8, 2021.

 

 

Reviewed by: Anatoly Rudd MD on 4/15/2022 10:06 AM PDT

Approved by: Anatoly Rudd MD on 4/15/2022 10:06 AM PDT

 

 

Station ID:  [id number]

## 2022-04-25 ENCOUNTER — HOSPITAL ENCOUNTER (OUTPATIENT)
Dept: HOSPITAL 76 - LAB | Age: 77
Discharge: HOME | End: 2022-04-25
Attending: NURSE PRACTITIONER
Payer: MEDICARE

## 2022-04-25 DIAGNOSIS — I10: ICD-10-CM

## 2022-04-25 DIAGNOSIS — E03.9: Primary | ICD-10-CM

## 2022-04-25 LAB
ALBUMIN DIAFP-MCNC: 3.7 G/DL (ref 3.2–5.5)
ALBUMIN/GLOB SERPL: 0.9 {RATIO} (ref 1–2.2)
ALP SERPL-CCNC: 67 IU/L (ref 42–121)
ALT SERPL W P-5'-P-CCNC: 13 IU/L (ref 10–60)
ANION GAP SERPL CALCULATED.4IONS-SCNC: 9 MMOL/L (ref 6–13)
AST SERPL W P-5'-P-CCNC: 18 IU/L (ref 10–42)
BASOPHILS NFR BLD AUTO: 0 10^3/UL (ref 0–0.1)
BASOPHILS NFR BLD AUTO: 0.5 %
BILIRUB BLD-MCNC: 0.7 MG/DL (ref 0.2–1)
BUN SERPL-MCNC: 21 MG/DL (ref 6–20)
CALCIUM UR-MCNC: 9.1 MG/DL (ref 8.5–10.3)
CHLORIDE SERPL-SCNC: 101 MMOL/L (ref 101–111)
CHOLEST SERPL-MCNC: 185 MG/DL
CO2 SERPL-SCNC: 25 MMOL/L (ref 21–32)
CREAT SERPLBLD-SCNC: 0.6 MG/DL (ref 0.4–1)
EOSINOPHIL # BLD AUTO: 0.1 10^3/UL (ref 0–0.7)
EOSINOPHIL NFR BLD AUTO: 1.5 %
ERYTHROCYTE [DISTWIDTH] IN BLOOD BY AUTOMATED COUNT: 13.2 % (ref 12–15)
GFRSERPLBLD MDRD-ARVRAT: 97 ML/MIN/{1.73_M2} (ref 89–?)
GLOBULIN SER-MCNC: 4.2 G/DL (ref 2.1–4.2)
GLUCOSE SERPL-MCNC: 134 MG/DL (ref 70–100)
HCT VFR BLD AUTO: 36.7 % (ref 37–47)
HDLC SERPL-MCNC: 69 MG/DL
HDLC SERPL: 2.7 {RATIO} (ref ?–4.4)
HGB UR QL STRIP: 12 G/DL (ref 12–16)
LDLC SERPL CALC-MCNC: 108 MG/DL
LDLC/HDLC SERPL: 1.6 {RATIO} (ref ?–4.4)
LYMPHOCYTES # SPEC AUTO: 1.6 10^3/UL (ref 1.5–3.5)
LYMPHOCYTES NFR BLD AUTO: 21.6 %
MCH RBC QN AUTO: 30.9 PG (ref 27–31)
MCHC RBC AUTO-ENTMCNC: 32.7 G/DL (ref 32–36)
MCV RBC AUTO: 94.6 FL (ref 81–99)
MONOCYTES # BLD AUTO: 0.6 10^3/UL (ref 0–1)
MONOCYTES NFR BLD AUTO: 7.3 %
NEUTROPHILS # BLD AUTO: 5.2 10^3/UL (ref 1.5–6.6)
NEUTROPHILS # SNV AUTO: 7.5 X10^3/UL (ref 4.8–10.8)
NEUTROPHILS NFR BLD AUTO: 68.7 %
NRBC # BLD AUTO: 0 /100WBC
NRBC # BLD AUTO: 0 X10^3/UL
PDW BLD AUTO: 8.4 FL (ref 7.9–10.8)
PLATELET # BLD: 368 10^3/UL (ref 130–450)
POTASSIUM SERPL-SCNC: 3.5 MMOL/L (ref 3.5–5)
PROT SPEC-MCNC: 7.9 G/DL (ref 6.7–8.2)
RBC MAR: 3.88 10^6/UL (ref 4.2–5.4)
SODIUM SERPLBLD-SCNC: 135 MMOL/L (ref 135–145)
TRIGL P FAST SERPL-MCNC: 42 MG/DL
TSH SERPL-ACNC: 1.76 UIU/ML (ref 0.34–5.6)
VLDLC SERPL-SCNC: 8 MG/DL

## 2022-04-25 PROCEDURE — 84443 ASSAY THYROID STIM HORMONE: CPT

## 2022-04-25 PROCEDURE — 80053 COMPREHEN METABOLIC PANEL: CPT

## 2022-04-25 PROCEDURE — 80061 LIPID PANEL: CPT

## 2022-04-25 PROCEDURE — 83721 ASSAY OF BLOOD LIPOPROTEIN: CPT

## 2022-04-25 PROCEDURE — 36415 COLL VENOUS BLD VENIPUNCTURE: CPT

## 2022-04-25 PROCEDURE — 85025 COMPLETE CBC W/AUTO DIFF WBC: CPT

## 2023-01-24 ENCOUNTER — HOSPITAL ENCOUNTER (OUTPATIENT)
Dept: HOSPITAL 76 - DI | Age: 78
Discharge: HOME | End: 2023-01-24
Attending: GENERAL ACUTE CARE HOSPITAL
Payer: MEDICARE

## 2023-01-24 DIAGNOSIS — Z12.31: Primary | ICD-10-CM

## 2023-01-24 NOTE — MAMMOGRAPHY REPORT
BILATERAL DIGITAL SCREENING MAMMOGRAM 3D/2D: 1/24/2023

 

CLINICAL: Routine screening.  

 

Comparison is made to exams dated:  4/26/2021 mammogram, 9/6/2018 mammogram, 6/12/2017 mammogram, 8/2
1/2015 mammogram, and 1/29/2014 mammogram - [facility].  

 

Both breasts are heterogeneously dense, which may obscure small masses (category c / 51-75% glandular
 tissue).  

 

No significant masses, calcifications, or other findings are seen in either breast.  

There has been no significant interval change.

 

IMPRESSION: NEGATIVE

There is no mammographic evidence of malignancy. A 1 year screening mammogram is recommended.  

 

Based on the Tyrer Cuzick model (a risk assessment model) the patients lifetime risk is 3.3% and her
 10 year risk is 0.0%. According to the ACR, ACS, and NCCN guidelines, an annual breast MRI exam tara
g with mammogram is recommended if the patients lifetime risk is 20% or greater.

 

 

This exam was interpreted at Station ID: [id number].  

 

NOTE: For mammograms, a report in lay terms will be sent to the patient. Approximately 15% of breast 
malignancies will not be visualized mammographically. In the management of a palpable breast mass, a 
negative mammogram must not discourage biopsy of a clinically suspicious lesion.

 

Electronically Signed By: Lv Avitia M.D.          

acr/penrad:1/24/2023 10:10:11  

 

 

 

ACR BI-RADS Category 1: Negative 3341F

PARENCHYMAL PATTERN: (D) - The breast(s) demonstrate(s) heterogeneously dense fibroglandular lane mejia.

BI-RADS CATEGORY: (1) - 1

RECOMMENDATION: (ANNUAL)  - Recommend routine annual screening mammography.

[id number]

1 year screening

LATERALITY: (B) SHORTNESS OF BREATH/DYSPNEA ON EXERTION

## 2023-04-04 ENCOUNTER — HOSPITAL ENCOUNTER (OUTPATIENT)
Dept: HOSPITAL 76 - DI | Age: 78
Discharge: HOME | End: 2023-04-04
Attending: NURSE PRACTITIONER
Payer: MEDICARE

## 2023-04-04 DIAGNOSIS — E04.2: Primary | ICD-10-CM

## 2023-04-05 NOTE — ULTRASOUND REPORT
PROCEDURE:  Head or Neck Soft Tissue

 

INDICATIONS:  THYROID NODULE

 

TECHNIQUE:  

Real-time scanning was performed of the thyroid gland, with image documentation.  

 

COMPARISON:  None

 

FINDINGS:  

Right:  Thyroid lobe measures 4.0 x 1.0 x 1.4 cm, and is homogeneous in echotexture.  

Left:  Thyroid lobe measures 4.2 x 3.4 x 4.9 cm, and is homogenous in echotexture.  

Isthmus:  5  mm thick.  

 

Nodule number:  One

Location:  Right lobe

Size:  0.9 x 0.7 x 0.7 cm compared to 0.8 x 0.7 x 1.0   cm.  

Composition:  Cystic.

Echogenicity:  Anechoic.   

Shape:  wider than tall.

Margins:  Smooth (0 points).

Echogenic foci:  None (0 points).

Total points:  0

ACR TI-RADS category:  0.

 

Nodule number:  Two

Location:  Right

Size:  0.6 x 0.4 x 0.4 centimeters compared to 0.6 0.4 x 0.5   cm.  

Composition:  Mixed.

Echogenicity:  Hypoechoic.   

Shape:  wider than tall.

Margins:  Smooth (0 points).

Echogenic foci: None

Total points:  3

ACR TI-RADS category:  3.

 

Nodule number:  Three

Location:  Right

Size:  0.6 x 0.4 x 0.5 cm compared to 0.5 x 0.4 x 0.6   cm.  

Composition:  Mixed.

Echogenicity:  Hypoechoic.   

Shape:  wider than tall.

Margins:  Smooth (0 points).

Echogenic foci:  None (0 points).

Total points:  3

ACR TI-RADS category:  3.

 

Nodule number:  Four

Location:  Left

Size:  4.1 x 3.8 x 5.1 cm compared to 4.5 x 2.7 x 3.7   cm.  

Composition:  Solid.

Echogenicity: Hyperechoic

Shape:  wider than tall.

Margins:  Smooth (0 points).

Echogenic foci:  Macrocalcifications.

Total points:  4

ACR TI-RADS category:  4. This lesion was previously biopsied.

 

Nodule number:  5

Location:  Left

Size:  0.9 x 1.1 x 0.7 cm compared to 1.0 x 0.6 x 0.7   cm.  

Composition:  Cystic.

Echogenicity:  Anechoic.   

Shape:  wider than tall.

Margins:  Smooth (0 points).

Echogenic foci:  None (0 points).

Total points:  0

ACR TI-RADS category:  0.

 

 

IMPRESSION:  

 

Bilateral thyroid nodules overall stable compared to prior exam.

 

ACR TI-RADS definitions and recommendations:  

TI-RADS 1 (benign): 0 points.  FNA not needed. 

TI-RADS 2 (not suspicious): 2 points.  FNA not needed. 

TI-RADS 3 (mildly suspicious): 3 points. 

 "FNA if 2.5 cm or larger, follow up if 1.5 cm or larger (at 1, 3, and 5 years). 

TI-RADS 4 (moderately suspicious): 4-6 points.  

 "FNA if 1.5 cm or larger, follow up if 1 cm or larger (at 1, 2, 3, and 5 years).  

TI-RADS 5 (highly suspicious): 7 points or more.  

 "FNA if 1 cm or larger, follow up if 0.5 cm or larger (every year for 5 years).  

 

Reviewed by: Cher Briseno MD on 4/5/2023 11:58 AM PDT

Approved by: Cher Briseno MD on 4/5/2023 11:58 AM PDT

 

 

Station ID:  [id number]

## 2023-06-13 ENCOUNTER — HOSPITAL ENCOUNTER (OUTPATIENT)
Dept: HOSPITAL 76 - LAB | Age: 78
Discharge: HOME | End: 2023-06-13
Attending: NURSE PRACTITIONER
Payer: MEDICARE

## 2023-06-13 DIAGNOSIS — E03.9: ICD-10-CM

## 2023-06-13 DIAGNOSIS — I10: Primary | ICD-10-CM

## 2023-06-13 LAB
ALBUMIN DIAFP-MCNC: 3.6 G/DL (ref 3.2–5.5)
ALBUMIN/GLOB SERPL: 0.8 {RATIO} (ref 1–2.2)
ALP SERPL-CCNC: 64 IU/L (ref 42–121)
ALT SERPL W P-5'-P-CCNC: 14 IU/L (ref 10–60)
ANION GAP SERPL CALCULATED.4IONS-SCNC: 8 MMOL/L (ref 6–13)
AST SERPL W P-5'-P-CCNC: 18 IU/L (ref 10–42)
BASOPHILS NFR BLD AUTO: 0 10^3/UL (ref 0–0.1)
BASOPHILS NFR BLD AUTO: 0.5 %
BILIRUB BLD-MCNC: 0.4 MG/DL (ref 0.2–1)
BUN SERPL-MCNC: 16 MG/DL (ref 6–20)
CALCIUM UR-MCNC: 8.9 MG/DL (ref 8.5–10.3)
CHLORIDE SERPL-SCNC: 103 MMOL/L (ref 101–111)
CO2 SERPL-SCNC: 26 MMOL/L (ref 21–32)
CREAT SERPLBLD-SCNC: 0.7 MG/DL (ref 0.4–1)
EOSINOPHIL # BLD AUTO: 0.2 10^3/UL (ref 0–0.7)
EOSINOPHIL NFR BLD AUTO: 2.7 %
ERYTHROCYTE [DISTWIDTH] IN BLOOD BY AUTOMATED COUNT: 13.1 % (ref 12–15)
GFRSERPLBLD MDRD-ARVRAT: 81 ML/MIN/{1.73_M2} (ref 89–?)
GLOBULIN SER-MCNC: 4.7 G/DL (ref 2.1–4.2)
GLUCOSE SERPL-MCNC: 112 MG/DL (ref 70–100)
HCT VFR BLD AUTO: 36.8 % (ref 37–47)
HGB UR QL STRIP: 11.9 G/DL (ref 12–16)
LYMPHOCYTES # SPEC AUTO: 1.7 10^3/UL (ref 1.5–3.5)
LYMPHOCYTES NFR BLD AUTO: 23.5 %
MCH RBC QN AUTO: 30.1 PG (ref 27–31)
MCHC RBC AUTO-ENTMCNC: 32.3 G/DL (ref 32–36)
MCV RBC AUTO: 92.9 FL (ref 81–99)
MONOCYTES # BLD AUTO: 0.5 10^3/UL (ref 0–1)
MONOCYTES NFR BLD AUTO: 7.4 %
NEUTROPHILS # BLD AUTO: 4.8 10^3/UL (ref 1.5–6.6)
NEUTROPHILS # SNV AUTO: 7.3 X10^3/UL (ref 4.8–10.8)
NEUTROPHILS NFR BLD AUTO: 65.6 %
NRBC # BLD AUTO: 0 /100WBC
NRBC # BLD AUTO: 0 X10^3/UL
PDW BLD AUTO: 8.7 FL (ref 7.9–10.8)
PLATELET # BLD: 424 10^3/UL (ref 130–450)
POTASSIUM SERPL-SCNC: 3.7 MMOL/L (ref 3.5–5)
PROT SPEC-MCNC: 8.3 G/DL (ref 6.7–8.2)
RBC MAR: 3.96 10^6/UL (ref 4.2–5.4)
SODIUM SERPLBLD-SCNC: 137 MMOL/L (ref 135–145)
TSH SERPL-ACNC: 1.54 UIU/ML (ref 0.34–5.6)

## 2023-06-13 PROCEDURE — 36415 COLL VENOUS BLD VENIPUNCTURE: CPT

## 2023-06-13 PROCEDURE — 84443 ASSAY THYROID STIM HORMONE: CPT

## 2023-06-13 PROCEDURE — 85025 COMPLETE CBC W/AUTO DIFF WBC: CPT

## 2023-06-13 PROCEDURE — 80053 COMPREHEN METABOLIC PANEL: CPT

## 2023-08-04 ENCOUNTER — HOSPITAL ENCOUNTER (OUTPATIENT)
Dept: HOSPITAL 76 - LAB | Age: 78
Discharge: HOME | End: 2023-08-04
Attending: NURSE PRACTITIONER
Payer: MEDICARE

## 2023-08-04 DIAGNOSIS — D64.9: Primary | ICD-10-CM

## 2023-08-04 DIAGNOSIS — R79.9: ICD-10-CM

## 2023-08-04 LAB
ALBUMIN DIAFP-MCNC: 3.9 G/DL (ref 3.2–5.5)
ALBUMIN/GLOB SERPL: 1 {RATIO} (ref 1–2.2)
ALP SERPL-CCNC: 72 IU/L (ref 42–121)
ALT SERPL W P-5'-P-CCNC: 10 IU/L (ref 10–60)
ANION GAP SERPL CALCULATED.4IONS-SCNC: 2 MMOL/L (ref 6–13)
AST SERPL W P-5'-P-CCNC: 16 IU/L (ref 10–42)
BASOPHILS NFR BLD AUTO: 0.1 10^3/UL (ref 0–0.1)
BASOPHILS NFR BLD AUTO: 0.6 %
BILIRUB BLD-MCNC: 0.5 MG/DL (ref 0.2–1)
BUN SERPL-MCNC: 17 MG/DL (ref 6–20)
CALCIUM UR-MCNC: 9.3 MG/DL (ref 8.5–10.3)
CHLORIDE SERPL-SCNC: 104 MMOL/L (ref 101–111)
CO2 SERPL-SCNC: 30 MMOL/L (ref 21–32)
CREAT SERPLBLD-SCNC: 0.7 MG/DL (ref 0.6–1.3)
EOSINOPHIL # BLD AUTO: 0.3 10^3/UL (ref 0–0.7)
EOSINOPHIL NFR BLD AUTO: 4 %
ERYTHROCYTE [DISTWIDTH] IN BLOOD BY AUTOMATED COUNT: 13.7 % (ref 12–15)
GFRSERPLBLD MDRD-ARVRAT: 81 ML/MIN/{1.73_M2} (ref 89–?)
GLOBULIN SER-MCNC: 4 G/DL (ref 2.1–4.2)
GLUCOSE SERPL-MCNC: 112 MG/DL (ref 74–104)
HCT VFR BLD AUTO: 37.9 % (ref 37–47)
HGB UR QL STRIP: 12.1 G/DL (ref 12–16)
LYMPHOCYTES # SPEC AUTO: 2 10^3/UL (ref 1.5–3.5)
LYMPHOCYTES NFR BLD AUTO: 24.4 %
MCH RBC QN AUTO: 30.2 PG (ref 27–31)
MCHC RBC AUTO-ENTMCNC: 31.9 G/DL (ref 32–36)
MCV RBC AUTO: 94.5 FL (ref 81–99)
MONOCYTES # BLD AUTO: 0.6 10^3/UL (ref 0–1)
MONOCYTES NFR BLD AUTO: 7.4 %
NEUTROPHILS # BLD AUTO: 5.1 10^3/UL (ref 1.5–6.6)
NEUTROPHILS # SNV AUTO: 8 X10^3/UL (ref 4.8–10.8)
NEUTROPHILS NFR BLD AUTO: 63.3 %
NRBC # BLD AUTO: 0 /100WBC
NRBC # BLD AUTO: 0 X10^3/UL
PDW BLD AUTO: 8.5 FL (ref 7.9–10.8)
PLATELET # BLD: 369 10^3/UL (ref 130–450)
POTASSIUM SERPL-SCNC: 4.2 MMOL/L (ref 3.5–4.5)
PROT SPEC-MCNC: 7.9 G/DL (ref 6.4–8.9)
RBC MAR: 4.01 10^6/UL (ref 4.2–5.4)
SODIUM SERPLBLD-SCNC: 136 MMOL/L (ref 135–145)

## 2023-08-04 PROCEDURE — 85025 COMPLETE CBC W/AUTO DIFF WBC: CPT

## 2023-08-04 PROCEDURE — 80053 COMPREHEN METABOLIC PANEL: CPT

## 2023-08-04 PROCEDURE — 36415 COLL VENOUS BLD VENIPUNCTURE: CPT

## 2024-04-15 ENCOUNTER — HOSPITAL ENCOUNTER (OUTPATIENT)
Dept: HOSPITAL 76 - DI | Age: 79
Discharge: HOME | End: 2024-04-15
Attending: NURSE PRACTITIONER
Payer: MEDICARE

## 2024-04-15 ENCOUNTER — HOSPITAL ENCOUNTER (OUTPATIENT)
Dept: HOSPITAL 76 - DI | Age: 79
Discharge: HOME | End: 2024-04-15
Attending: GENERAL ACUTE CARE HOSPITAL
Payer: MEDICARE

## 2024-04-15 DIAGNOSIS — R92.333: ICD-10-CM

## 2024-04-15 DIAGNOSIS — M85.89: ICD-10-CM

## 2024-04-15 DIAGNOSIS — Z12.31: Primary | ICD-10-CM

## 2024-04-15 DIAGNOSIS — E04.2: Primary | ICD-10-CM

## 2024-04-15 DIAGNOSIS — Z78.0: ICD-10-CM

## 2024-04-15 NOTE — DEXA REPORT
PROCEDURE: Dexa Spine and/or Hip

 

INDICATIONS: POST MENOPAUSAL, THYROID NODULE

 

TECHNIQUE: Dual energy x-ray absorptiometry (DXA) was performed on a [employer] System.  Regions measur
ed are the AP Spine, femoral neck, and if needed forearm.

 

COMPARISON: None

  

FINDINGS:

 

Lumbar Spine: 

Bone Mineral Density: 0.991 g/cm/cm,T score:  -1.6.

 

Right Femoral Neck:

Bone Mineral Density: 0.845 g/cm/cm, T score: -1.4. 

 

Right Hip:

Bone Mineral Density: 0.744 g/cm/cm,T score: -2.1.

 

 

(T score greater or equal to -1.0: NORMAL)

(T score from -1.1 to -2.4: OSTEOPENIA)

(T score less than or equal to -2.5 to: OSTEOPOROSIS)

 

Impression: 

By WHO criteria, this patient has low bone density (osteopenia). 

 

 

Patients with diagnosis of osteoporosis or osteopenia should have regular bone mineral density assess
ment.  For those eligible for Medicare, routine testing is allowed once every 2 years.  Testing frequ
ency can be increased for patients who have rapidly progressing disease or for those who are receivin
g medical therapy to restore bone mass.

 

Reviewed by: Yunier Kong MD on 4/15/2024 4:37 PM PDT

Approved by: Yunier Kong MD on 4/15/2024 4:37 PM PDT

 

 

Station ID:  [id number]

## 2024-04-16 NOTE — MAMMOGRAPHY REPORT
BILATERAL DIGITAL SCREENING MAMMOGRAM 3D/2D: 4/15/2024

 

CLINICAL: Routine screening.  

 

Comparison is made to exams dated:  1/24/2023 mammogram, 4/26/2021 mammogram, 9/6/2018 mammogram, 6/1
2/2017 mammogram, 1/29/2014 mammogram, and 8/21/2015 mammogram - [facility].  

 

Both breasts are heterogeneously dense, which may obscure small masses (category c / 51-75% glandular
 tissue).  

 

No significant masses, calcifications, or other findings are seen in either breast.  

There has been no significant interval change.

 

IMPRESSION: NEGATIVE

There is no mammographic evidence of malignancy. A 1 year screening mammogram is recommended.  

 

Based on the Tyrer Cuzick model (a risk assessment model) the patient's lifetime risk is 3.0% and her
 10 year risk is 0.0%. According to the ACR, ACS, and NCCN guidelines, an annual breast MRI exam tara
g with mammogram is recommended if the patient's lifetime risk is 20% or greater.

 

 

This exam was interpreted at Station ID: [id number].  

 

NOTE: For mammograms, a report in lay terms will be sent to the patient. Approximately 15% of breast 
malignancies will not be visualized mammographically. In the management of a palpable breast mass, a 
negative mammogram must not discourage biopsy of a clinically suspicious lesion.

 

Electronically Signed By: Ciarra gibbs/melissa:4/15/2024 16:40:59  

 

 

letter sent: No_Letter  

ACR BI-RADS Category 1: Negative 3341F

PARENCHYMAL PATTERN: (D) - The breast(s) demonstrate(s) heterogeneously dense fibroglandular lane mejia.

BI-RADS CATEGORY: (1) - 1

RECOMMENDATION: (ANNUAL)  - Recommend routine annual screening mammography.

[id number]

1 year screening

LATERALITY: (B)

## 2024-04-16 NOTE — ULTRASOUND REPORT
PROCEDURE:  Soft Tissue Head or Neck

 

INDICATIONS:  POST MENOPAUSAL, THYROID NODULE

 

TECHNIQUE:  

Real-time scanning was performed of the thyroid gland, with image documentation.  

 

COMPARISON:  Ultrasound head and neck, 4/4/2023. Fine-needle aspiration biopsy left thyroid nodule, 6
/20/2021.

 

FINDINGS:  

Right:  Thyroid lobe measures 3.6 x 0.9 x 0.9 cm, and is homogeneous in echotexture.  

Left:  Thyroid lobe measures 5.0 x 3.2 x 2.9 cm, and is homogenous in echotexture.  

Isthmus:  0.5  cm thick.  

 

Nodule number:  1

Location:  Right inferior.

Size:  0.7 x 0.9 x 0.5 cm.; Previously 0.7 x 0.9 x 0.7 cm  

Composition:  Cystic / almost completely cystic (0 points).

Echogenicity:  Anechoic (0 points).   

Shape:  wider than tall (0 points).

Margins:  Smooth (0 points).

Echogenic foci:  None (0 points).

Total points:  0

ACR TI-RADS category:  1

 

Nodule number:  2

Location:  Location

Size:  0.5 x 0.4 x 0.6 cm; previously 0.4 x 0.6 x 0.4 cm.  

Composition:  Solid (2 points).

Echogenicity:  Isoechoic (1 point).   

Shape:  wider than tall (0 points).

Margins:  Smooth (0 points).

Echogenic foci:  None (0 points).

Total points:  3

ACR TI-RADS category:  3

 

Nodule number:  3

Location:  Isthmus

Size:  0.5 x 0.5 x 0.5 cm; previously 0.5 x 0.4 x 0.6 cm.  

Composition:  Solid (2 points).

Echogenicity:  Hypoechoic (2 points).   

Shape:  wider than tall (0 points).

Margins:  Smooth (0 points).

Echogenic foci:  None (0 points).

Total points:  4

ACR TI-RADS category:  4

 

Nodule number:  4

Location:  Left mid

Size:  4.0 x 3.2 x 4.2 cm previously 4.5 x 2.7 x 3.7 cm.  

Composition:  Solid (2 points).

Echogenicity:  Very hypoechoic (3 points).   

Shape:  wider than tall (0 points).

Margins:  Smooth (0 points).

Echogenic foci:  None (0 points).

Total points:  5

ACR TI-RADS category:  5

 

IMPRESSION:  Stable thyroid nodules bilaterally. Nodule 4 meets the criteria for fine-needle aspirati
on biopsy (previously biopsied on 6/20/2010 21). Please correlate with prior biopsy result. Continue 
follow-up ultrasound to document 5 years stability.

 

ACR TI-RADS definitions and recommendations:  

TI-RADS 1 (benign): 0 points.  FNA not needed. 

TI-RADS 2 (not suspicious): 2 points.  FNA not needed. 

TI-RADS 3 (mildly suspicious): 3 points. 

 "FNA if 2.5 cm or larger, follow up if 1.5 cm or larger (at 1, 3, and 5 years). 

TI-RADS 4 (moderately suspicious): 4-6 points.  

 "FNA if 1.5 cm or larger, follow up if 1 cm or larger (at 1, 2, 3, and 5 years).  

TI-RADS 5 (highly suspicious): 7 points or more.  

 "FNA if 1 cm or larger, follow up if 0.5 cm or larger (every year for 5 years).  

 

Reviewed by: Jeff Salguero MD on 4/16/2024 8:26 AM PDT

Approved by: Jeff Salguero MD on 4/16/2024 8:26 AM PDT

 

 

Station ID:  [id number]

## 2024-08-23 ENCOUNTER — HOSPITAL ENCOUNTER (OUTPATIENT)
Dept: HOSPITAL 76 - LAB | Age: 79
Discharge: HOME | End: 2024-08-23
Attending: NURSE PRACTITIONER
Payer: MEDICARE

## 2024-08-23 DIAGNOSIS — I10: Primary | ICD-10-CM

## 2024-08-23 DIAGNOSIS — E03.9: ICD-10-CM

## 2024-08-23 LAB
ALBUMIN DIAFP-MCNC: 3.9 G/DL (ref 3.2–5.5)
ALBUMIN/GLOB SERPL: 1 {RATIO} (ref 1–2.2)
ALP SERPL-CCNC: 75 IU/L (ref 42–121)
ALT SERPL W P-5'-P-CCNC: 11 IU/L (ref 10–60)
ANION GAP SERPL CALCULATED.4IONS-SCNC: 5 MMOL/L (ref 6–13)
AST SERPL W P-5'-P-CCNC: 16 IU/L (ref 10–42)
BASOPHILS NFR BLD AUTO: 0 10^3/UL (ref 0–0.1)
BASOPHILS NFR BLD AUTO: 0.3 %
BILIRUB BLD-MCNC: 0.4 MG/DL (ref 0.2–1)
BUN SERPL-MCNC: 19 MG/DL (ref 6–20)
CALCIUM UR-MCNC: 9.4 MG/DL (ref 8.5–10.3)
CHLORIDE SERPL-SCNC: 102 MMOL/L (ref 101–111)
CO2 SERPL-SCNC: 30 MMOL/L (ref 21–32)
CREAT SERPLBLD-SCNC: 0.6 MG/DL (ref 0.6–1.3)
EOSINOPHIL # BLD AUTO: 0.2 10^3/UL (ref 0–0.7)
EOSINOPHIL NFR BLD AUTO: 1.9 %
ERYTHROCYTE [DISTWIDTH] IN BLOOD BY AUTOMATED COUNT: 13.2 % (ref 12–15)
GFRSERPLBLD MDRD-ARVRAT: 96 ML/MIN/{1.73_M2} (ref 89–?)
GLOBULIN SER-MCNC: 4.1 G/DL (ref 2.1–4.2)
GLUCOSE SERPL-MCNC: 91 MG/DL (ref 74–104)
HCT VFR BLD AUTO: 39.2 % (ref 37–47)
HGB UR QL STRIP: 12.6 G/DL (ref 12–16)
LYMPHOCYTES # SPEC AUTO: 1.9 10^3/UL (ref 1.5–3.5)
LYMPHOCYTES NFR BLD AUTO: 21.1 %
MCH RBC QN AUTO: 31.2 PG (ref 27–31)
MCHC RBC AUTO-ENTMCNC: 32.1 G/DL (ref 32–36)
MCV RBC AUTO: 97 FL (ref 81–99)
MONOCYTES # BLD AUTO: 0.6 10^3/UL (ref 0–1)
MONOCYTES NFR BLD AUTO: 6.9 %
NEUTROPHILS # BLD AUTO: 6.4 10^3/UL (ref 1.5–6.6)
NEUTROPHILS # SNV AUTO: 9.1 X10^3/UL (ref 4.8–10.8)
NEUTROPHILS NFR BLD AUTO: 69.6 %
NRBC # BLD AUTO: 0 /100WBC
NRBC # BLD AUTO: 0 X10^3/UL
PDW BLD AUTO: 8.5 FL (ref 7.9–10.8)
PLATELET # BLD: 388 10^3/UL (ref 130–450)
POTASSIUM SERPL-SCNC: 3.7 MMOL/L (ref 3.5–4.5)
PROT SPEC-MCNC: 8 G/DL (ref 6.4–8.9)
RBC MAR: 4.04 10^6/UL (ref 4.2–5.4)
SODIUM SERPLBLD-SCNC: 137 MMOL/L (ref 135–145)
TSH SERPL-ACNC: 1.89 UIU/ML (ref 0.34–5.6)

## 2024-08-23 PROCEDURE — 85025 COMPLETE CBC W/AUTO DIFF WBC: CPT

## 2024-08-23 PROCEDURE — 36415 COLL VENOUS BLD VENIPUNCTURE: CPT

## 2024-08-23 PROCEDURE — 84443 ASSAY THYROID STIM HORMONE: CPT

## 2024-08-23 PROCEDURE — 80053 COMPREHEN METABOLIC PANEL: CPT
